# Patient Record
Sex: MALE | Race: WHITE | NOT HISPANIC OR LATINO | Employment: FULL TIME | URBAN - METROPOLITAN AREA
[De-identification: names, ages, dates, MRNs, and addresses within clinical notes are randomized per-mention and may not be internally consistent; named-entity substitution may affect disease eponyms.]

---

## 2019-01-23 ENCOUNTER — TELEPHONE (OUTPATIENT)
Dept: SCHEDULING | Facility: IMAGING CENTER | Age: 29
End: 2019-01-23

## 2019-01-31 ENCOUNTER — OFFICE VISIT (OUTPATIENT)
Dept: MEDICAL GROUP | Facility: PHYSICIAN GROUP | Age: 29
End: 2019-01-31
Payer: COMMERCIAL

## 2019-01-31 VITALS
SYSTOLIC BLOOD PRESSURE: 118 MMHG | RESPIRATION RATE: 19 BRPM | HEIGHT: 66 IN | OXYGEN SATURATION: 95 % | HEART RATE: 79 BPM | DIASTOLIC BLOOD PRESSURE: 82 MMHG | TEMPERATURE: 97.6 F | BODY MASS INDEX: 30.44 KG/M2 | WEIGHT: 189.4 LBS

## 2019-01-31 DIAGNOSIS — Z01.818 PREOPERATIVE CLEARANCE: ICD-10-CM

## 2019-01-31 DIAGNOSIS — E66.9 OBESITY (BMI 30-39.9): ICD-10-CM

## 2019-01-31 DIAGNOSIS — Z23 NEED FOR VACCINATION: ICD-10-CM

## 2019-01-31 DIAGNOSIS — K40.90 LEFT INGUINAL HERNIA: ICD-10-CM

## 2019-01-31 PROCEDURE — 90471 IMMUNIZATION ADMIN: CPT | Performed by: NURSE PRACTITIONER

## 2019-01-31 PROCEDURE — 90715 TDAP VACCINE 7 YRS/> IM: CPT | Performed by: NURSE PRACTITIONER

## 2019-01-31 PROCEDURE — 99203 OFFICE O/P NEW LOW 30 MIN: CPT | Mod: 25 | Performed by: NURSE PRACTITIONER

## 2019-01-31 ASSESSMENT — ENCOUNTER SYMPTOMS
FEVER: 0
BLURRED VISION: 0
BLOOD IN STOOL: 0
ABDOMINAL PAIN: 0
CHILLS: 0

## 2019-01-31 ASSESSMENT — PATIENT HEALTH QUESTIONNAIRE - PHQ9: CLINICAL INTERPRETATION OF PHQ2 SCORE: 0

## 2019-01-31 NOTE — PROGRESS NOTES
New Patient appointment    Mark Johnston is a 28 y.o. male here to establish care. His previous PCP was [facility]. He presents with the following concerns:    HPI:      Left inguinal hernia  Onset began 4 years ago. He does reports mild, intermittent abdominal pain. His BM are regular. He is planning to have hernia repair with Dr. Callahan at [facility] on 2/15/19.      Current medicines (including changes today)  No current outpatient prescriptions on file.     No current facility-administered medications for this visit.      He  has a past medical history of ADHD and Hernia, inguinal, left.  He  has a past surgical history that includes dental extraction(s).  Social History   Substance Use Topics   • Smoking status: Current Every Day Smoker     Packs/day: 1.00     Years: 10.00     Types: Cigarettes   • Smokeless tobacco: Never Used   • Alcohol use No     Social History     Social History Narrative   • No narrative on file     Family History   Problem Relation Age of Onset   • Cancer Mother         Lymphoma   • Lung Disease Mother    • No Known Problems Father    • No Known Problems Sister    • Breast Cancer Maternal Grandmother    • Heart Attack Paternal Grandmother    • Stroke Paternal Grandfather    • Seizures Paternal Grandfather      No family status information on file.       Review of Systems   Constitutional: Negative for chills, fever and malaise/fatigue.   HENT: Negative for hearing loss.    Eyes: Negative for blurred vision.   Cardiovascular: Negative for chest pain.   Gastrointestinal: Negative for abdominal pain and blood in stool.       All other systems reviewed and are negative    Depression Screening    Little interest or pleasure in doing things?  0 - not at all  Feeling down, depressed , or hopeless? 0 - not at all  Patient Health Questionnaire Score: 0    If depressive symptoms identified deferred to follow up visit unless specifically addressed in assesment  "and plan.      Interpretation of PHQ-9 Total Score   Score Severity   1-4 Minimal Depression   5-9 Mild Depression   10-14 Moderate Depression   15-19 Moderately Severe Depression   20-27 Severe Depression       Objective:     Blood pressure 118/82, pulse 79, temperature 36.4 °C (97.6 °F), temperature source Temporal, resp. rate 19, height 1.664 m (5' 5.5\"), weight 85.9 kg (189 lb 6.4 oz), SpO2 95 %. Body mass index is 31.04 kg/m².    Physical Exam:  Constitutional: Oriented to person, place, and time and well-developed, well-nourished, and in no distress.   HENT:   Head: Normocephalic and atraumatic.   Right Ear: Tympanic membrane and external ear normal.   Left Ear: Tympanic membrane and external ear normal.   Mouth/Throat: Oropharynx is clear and moist and mucous membranes are normal. No oropharyngeal exudate or posterior oropharyngeal erythema.   Eyes: Conjunctivae and EOM are normal. Pupils are equal, round, and reactive to light. He wears corrective glasses.  Neck: Normal range of motion. Neck supple. No thyromegaly present.   Cardiovascular: Normal rate, regular rhythm, normal heart sounds. Radial pulses intact. Exam reveals no friction rub. No murmur heard.  Pulmonary/Chest: Effort normal and breath sounds normal. No respiratory distress or use of accessory muscles. No wheezes, rhonchi, or rales.   Abdominal: Soft. Bowel sounds are normal. Exhibits no distension and no mass. There is no tenderness. No hepatosplenomegaly.    Musculoskeletal: Full range of motion. No deformity or swelling of joints. DTRs intact.   Neurological: Alert and oriented to person, place, and time. Gait normal.   Skin: Skin is warm and dry. No cyanosis. No edema.  Psychiatric: Mood, memory, affect and judgment normal.     Assessment and Plan:   The following treatment plan was discussed    1. Left inguinal hernia  He is planning to have surgery on 2/15/19 with Dr. Callahan. He had labs 2 weeks ago with previous PCP. Records requested. " Chest xray ordered.    2. Preoperative clearance  - DX-CHEST-2 VIEWS; Future    3. Obesity (BMI 30-39.9)  - Patient identified as having weight management issue.  Appropriate orders and counseling given.    4. Need for vaccination  I have placed the below orders and discussed them with an approved delegating provider.  The MA is performing the below orders under the direction of Dr. Daniel.  - Tdap =>8yo IM    Records requested.    Followup: Return in about 1 week (around 2/7/2019) for For follow-up on preop clearance.

## 2019-01-31 NOTE — LETTER
[facility]  ROSIE Estes  [address]  [facility] [address]  Fax: [phone]   Authorization for Release/Disclosure of   Protected Health Information   Name: KAILYN SIMS : 1990 SSN: [id number]   Address: [address] [place] [address] Phone:    [phone] (home)    I authorize the entity listed below to release/disclose the PHI below to:   [facility]/ROSIE Estes and ROSIE Estes   Provider or Entity Name:  [facility] Cornelia Garcia   Address   City, [facility]   Phone:  [phone]    Fax:     Reason for request: continuity of care   Information to be released:    [  ] LAST COLONOSCOPY,  including any PATH REPORT and follow-up  [  ] LAST FIT/COLOGUARD RESULT [  ] LAST DEXA  [  ] LAST MAMMOGRAM  [  ] LAST PAP  [  ] LAST LABS [  ] RETINA EXAM REPORT  [  ] IMMUNIZATION RECORDS  [  X] Release all info      [  ] Check here and initial the line next to each item to release ALL health information INCLUDING  _____ Care and treatment for drug and / or alcohol abuse  _____ HIV testing, infection status, or AIDS  _____ Genetic Testing    DATES OF SERVICE OR TIME PERIOD TO BE DISCLOSED: _____________  I understand and acknowledge that:  * This Authorization may be revoked at any time by you in writing, except if your health information has already been used or disclosed.  * Your health information that will be used or disclosed as a result of you signing this authorization could be re-disclosed by the recipient. If this occurs, your re-disclosed health information may no longer be protected by State or Federal laws.  * You may refuse to sign this Authorization. Your refusal will not affect your ability to obtain treatment.  * This Authorization becomes effective upon signing and will  on (date) __________.      If no date is indicated, this Authorization will  one (1) year from the signature date.    Name: Kailyn  Preston    Signature:   Date:     1/31/2019       PLEASE FAX REQUESTED RECORDS BACK TO: [phone]

## 2019-01-31 NOTE — ASSESSMENT & PLAN NOTE
Onset began 4 years ago. He does reports mild, intermittent abdominal pain. His BM are regular. He is planning to have hernia repair with Dr. Callahan at [facility] on 2/15/19.

## 2019-02-05 ENCOUNTER — HOSPITAL ENCOUNTER (OUTPATIENT)
Dept: RADIOLOGY | Facility: MEDICAL CENTER | Age: 29
End: 2019-02-05
Attending: NURSE PRACTITIONER
Payer: COMMERCIAL

## 2019-02-05 DIAGNOSIS — Z01.818 PREOPERATIVE CLEARANCE: ICD-10-CM

## 2019-02-05 PROCEDURE — 71046 X-RAY EXAM CHEST 2 VIEWS: CPT

## 2019-02-06 ENCOUNTER — OFFICE VISIT (OUTPATIENT)
Dept: MEDICAL GROUP | Facility: PHYSICIAN GROUP | Age: 29
End: 2019-02-06
Payer: COMMERCIAL

## 2019-02-06 ENCOUNTER — TELEPHONE (OUTPATIENT)
Dept: MEDICAL GROUP | Facility: PHYSICIAN GROUP | Age: 29
End: 2019-02-06

## 2019-02-06 VITALS
WEIGHT: 188 LBS | RESPIRATION RATE: 14 BRPM | TEMPERATURE: 98.1 F | DIASTOLIC BLOOD PRESSURE: 88 MMHG | HEART RATE: 71 BPM | BODY MASS INDEX: 30.22 KG/M2 | OXYGEN SATURATION: 95 % | HEIGHT: 66 IN | SYSTOLIC BLOOD PRESSURE: 124 MMHG

## 2019-02-06 DIAGNOSIS — K40.90 LEFT INGUINAL HERNIA: ICD-10-CM

## 2019-02-06 DIAGNOSIS — Z01.818 PREOPERATIVE CLEARANCE: ICD-10-CM

## 2019-02-06 PROCEDURE — 93000 ELECTROCARDIOGRAM COMPLETE: CPT | Performed by: NURSE PRACTITIONER

## 2019-02-06 PROCEDURE — 99214 OFFICE O/P EST MOD 30 MIN: CPT | Performed by: NURSE PRACTITIONER

## 2019-02-06 NOTE — TELEPHONE ENCOUNTER
Patient is needing an referral for Ricardo Trivedi,  for the hernia repair due to his insurance. The patient does have an appointment today for the clearance.

## 2019-02-07 NOTE — PROGRESS NOTES
"REASON FOR VISIT: Pre-Op Consultation  Consultation Requested by: Dr. Callahan  Procedure date and type: 2/15/2019    History of condition for which surgery is planned: Left hernia repair    Current chronic conditions: has Obesity (BMI 30-39.9) and Left inguinal hernia on his problem list.  Past medical history:  has a past medical history of ADHD and Hernia, inguinal, left.. Negative for: CAD, SBE, CVA, TIA, DVT, PE, bleeding requiring transfusion, intubation.  Surgical and anesthetic history:  has a past surgical history that includes dental extraction(s). Prior surgery without complication, bleeding, reaction to anesthetic, prolonged recovery  Habits:   Social History   Substance Use Topics   • Smoking status: Current Every Day Smoker     Packs/day: 1.00     Years: 10.00     Types: Cigarettes   • Smokeless tobacco: Never Used   • Alcohol use No     Allergies: Patient has no known allergies. No known allergy to Anesthetic, or Latex.     Current medicines:   No current outpatient prescriptions on file.     No current facility-administered medications for this visit.      Anticoagulant: ASA, NSAIDs    Herbals: Black Cohash, Echinacea, Garlic, Alexus, Ginkgo Biloba, Ginseng, Kava, Martin’s Wort,  Saw Palmetto, Valerian             ROS: negative for: CP, SOB, CHAVEZ, Orthopnea, wheezing, leg edema, polydipsia, polyuria, fevers, chills, sweats, cough, cold, congestion, abd pain, reflux, black or bloody stools, weight loss/gain.  Functional Status: ambulatory, he does wear glasses    PHYSICAL EXAMINATION:  VITAL SIGNS: Blood pressure 124/88, pulse 71, temperature 36.7 °C (98.1 °F), resp. rate 14, height 1.664 m (5' 5.5\"), weight 85.3 kg (188 lb), SpO2 95 %. Body mass index is 30.81 kg/m².  HEENT: EOMI, PERRL. Oropharynx pink, moist. Mallampati: II (soft palate, uvula, fauces visible). Neck supple, no cervical lymphadenopathy.   LUNGS: CTAB good excursion.   HEART: RRR no murmur.  ABDOMEN: soft, nondistended, nontender normal " BS. No HSM.  LOWER EXTREMITIES: warm and well perfused with no edema.    Labs: See attached.    IMPRESSION:  The encounter diagnosis was Preoperative clearance.  1. Planned surgery: Left inguinal repair   2. High risk medical conditions: negative for Cardiac, Pulmonary, Bleeding, Poor healing, Thrombosis, Debility    PLAN:  1. Chronic medical conditions: Stable and controlled. Continue current medicines.   2. Avoid drugs that potentiate bleeding as advised by surgeon  3. Discontinue all herbal supplements 2 weeks prior to surgery.  4. Need for SBE prophylaxis: No  5. This patient is considered Low risk for cardiopulmonary complications for this planned surgery.    Sukhdev Index for assessing perioperative cardiovascular risk [Circulation 1999;100:1047]:   (one point each for * high-risk surgery [ intrathoracic, suprainguinal vascular, intraperitoneal ],* Hx ischemic heart dz, * Hx CHF, *Hx TIA or CVA, *IDDM, *Serum Cr >2.0)   Interpretation of risk: (Complication = MI, Pulm edema, v-fib or cardiac arrest, complete heart block)  Very Low: 0 points = 0.4 % complication. Low: 1 point = 0.9 %, Moderate: 2 points = 6.6 %, High: 3+ points = 11%.

## 2019-02-11 ENCOUNTER — TELEPHONE (OUTPATIENT)
Dept: MEDICAL GROUP | Facility: PHYSICIAN GROUP | Age: 29
End: 2019-02-11

## 2019-02-11 NOTE — TELEPHONE ENCOUNTER
----- Message from ROSIE Estes sent at 2/6/2019 12:42 PM PST -----  I will discuss results at upcoming appointment.    ROSIE Estes

## 2019-02-12 ENCOUNTER — TELEPHONE (OUTPATIENT)
Dept: MEDICAL GROUP | Facility: PHYSICIAN GROUP | Age: 29
End: 2019-02-12

## 2019-02-12 NOTE — TELEPHONE ENCOUNTER
Estefani called from Dr. Trivedi office, said that she needed the PCP to place a referral for patient.  Patient is schedule for surgery this Friday. [phone].    I called referrals, the referral was completed today at 9am.    Provider had referral in on 2/6/19.    I called Estefani to let her know.

## 2019-03-08 ENCOUNTER — OFFICE VISIT (OUTPATIENT)
Dept: URGENT CARE | Facility: CLINIC | Age: 29
End: 2019-03-08
Payer: COMMERCIAL

## 2019-03-08 VITALS
SYSTOLIC BLOOD PRESSURE: 122 MMHG | OXYGEN SATURATION: 98 % | BODY MASS INDEX: 31.32 KG/M2 | DIASTOLIC BLOOD PRESSURE: 72 MMHG | TEMPERATURE: 99 F | RESPIRATION RATE: 16 BRPM | HEART RATE: 76 BPM | WEIGHT: 188 LBS | HEIGHT: 65 IN

## 2019-03-08 DIAGNOSIS — S46.811A TRAPEZIUS STRAIN, RIGHT, INITIAL ENCOUNTER: ICD-10-CM

## 2019-03-08 PROCEDURE — 99214 OFFICE O/P EST MOD 30 MIN: CPT | Performed by: PHYSICIAN ASSISTANT

## 2019-03-08 RX ORDER — CYCLOBENZAPRINE HCL 10 MG
10 TABLET ORAL 3 TIMES DAILY PRN
Qty: 30 TAB | Refills: 0 | Status: SHIPPED | OUTPATIENT
Start: 2019-03-08 | End: 2022-01-21

## 2019-03-08 ASSESSMENT — ENCOUNTER SYMPTOMS
FALLS: 0
SENSORY CHANGE: 0
FOCAL WEAKNESS: 0
TINGLING: 0

## 2019-03-09 NOTE — PROGRESS NOTES
"Subjective:   Mark Johnston is a 29 y.o. male who presents for Shoulder Pain (R shoulder pain x3 days, states no injury)    This is a new problem.  Patient presents to urgent care with right posterior shoulder/upper back pain for the past 3 days.  Patient denies any injury or recent fall.  He denies any neck pain.  He denies any numbness, tingling, weakness of the extremities.  He denies any prior issues with the shoulder.  The patient states that he had some leftover oxycodone from a recent surgery that he took for the shoulder pain which did help and helped him to get some rest.  He is not been taking any anti-inflammatory medication.        Shoulder Pain       Review of Systems   Musculoskeletal: Positive for joint pain. Negative for falls.   Neurological: Negative for tingling, sensory change and focal weakness.   All other systems reviewed and are negative.    No Known Allergies     Objective:   /72 (BP Location: Left arm, Patient Position: Sitting)   Pulse 76   Temp 37.2 °C (99 °F)   Resp 16   Ht 1.651 m (5' 5\")   Wt 85.3 kg (188 lb)   SpO2 98%   BMI 31.28 kg/m²   Physical Exam   Constitutional: He is oriented to person, place, and time. He appears well-developed and well-nourished.   HENT:   Head: Normocephalic and atraumatic.   Right Ear: Tympanic membrane, external ear and ear canal normal.   Left Ear: Tympanic membrane, external ear and ear canal normal.   Nose: Nose normal.   Mouth/Throat: Uvula is midline, oropharynx is clear and moist and mucous membranes are normal. No oropharyngeal exudate.   Eyes: Pupils are equal, round, and reactive to light. Conjunctivae and EOM are normal.   Neck: Normal range of motion. Neck supple.   Cardiovascular: Normal rate, regular rhythm and normal heart sounds.  Exam reveals no friction rub.    No murmur heard.  Pulses:       Radial pulses are 2+ on the right side.   Pulmonary/Chest: Effort normal and breath sounds normal. No respiratory distress.   "   Abdominal: Soft. Bowel sounds are normal. There is no hepatosplenomegaly. There is no tenderness.   Musculoskeletal: Normal range of motion.        Right shoulder: He exhibits tenderness, crepitus, pain and spasm. He exhibits normal range of motion, no bony tenderness, no swelling, no effusion and no deformity.        Cervical back: He exhibits tenderness, pain and spasm. He exhibits no bony tenderness, no swelling, no edema and no deformity.        Thoracic back: Normal.        Back:         Arms:  Right shoulder: Full range of motion without limitation.  Negative drop arm  Negative empty can test  No pain with resisted abduction.   Lymphadenopathy:        Head (right side): No submental, no submandibular and no tonsillar adenopathy present.        Head (left side): No submental, no submandibular and no tonsillar adenopathy present.     He has no cervical adenopathy.        Right: No supraclavicular adenopathy present.        Left: No supraclavicular adenopathy present.   Neurological: He is alert and oriented to person, place, and time. He has normal strength. No cranial nerve deficit or sensory deficit. Coordination normal.   Skin: Skin is warm and dry. No rash noted.   Psychiatric: He has a normal mood and affect. Judgment normal.   Vitals reviewed.          Assessment/Plan:   Assessment    1. Trapezius strain, right, initial encounter  - cyclobenzaprine (FLEXERIL) 10 MG Tab; Take 1 Tab by mouth 3 times a day as needed.  Dispense: 30 Tab; Refill: 0    Patient does continually bring up the issue of pain medication.  I did review with the patient that I will do not feel that narcotic pain medication is warranted for this particular complaint.  Prescription for Flexeril is provided.  Recommend over-the-counter ibuprofen 600-800 mg every 8 hours as needed.  He may use over-the-counter muscle rub such as Biofreeze.  I did offer to place a referral to sports medicine which she declines at this time.    Patient  states he has an appointment with his primary care on Monday which she is encouraged to keep.    Differential diagnosis, natural history, supportive care, and indications for immediate follow-up discussed.    If not improving in 3-5 days, F/U with PCP or return to  or sooner if worsens  Strict ER precautions given.    Please note that this note was created using voice recognition speech to text software. Every effort has been made to correct obvious errors.  However, I expect there are errors of grammar and possibly context that were not discovered prior to finalizing the note

## 2019-03-09 NOTE — PATIENT INSTRUCTIONS
Shoulder Pain  Many things can cause shoulder pain, including:  · An injury.  · Moving the arm in the same way again and again (overuse).  · Joint pain (arthritis).  Follow these instructions at home:  Take these actions to help with your pain:  · Squeeze a soft ball or a foam pad as much as you can. This helps to prevent swelling. It also makes the arm stronger.  · Take over-the-counter and prescription medicines only as told by your doctor.  · If told, put ice on the area:  ¨ Put ice in a plastic bag.  ¨ Place a towel between your skin and the bag.  ¨ Leave the ice on for 20 minutes, 2-3 times per day. Stop putting on ice if it does not help with the pain.  · If you were given a shoulder sling or immobilizer:  ¨ Wear it as told.  ¨ Remove it to shower or bathe.  ¨ Move your arm as little as possible.  ¨ Keep your hand moving. This helps prevent swelling.  Contact a doctor if:  · Your pain gets worse.  · Medicine does not help your pain.  · You have new pain in your arm, hand, or fingers.  Get help right away if:  · Your arm, hand, or fingers:  ¨ Tingle.  ¨ Are numb.  ¨ Are swollen.  ¨ Are painful.  ¨ Turn white or blue.  This information is not intended to replace advice given to you by your health care provider. Make sure you discuss any questions you have with your health care provider.  Document Released: 06/05/2009 Document Revised: 08/13/2017 Document Reviewed: 04/11/2016  [employer] Interactive Patient Education © 2017 [employer]    Muscle Strain  A muscle strain (pulled muscle) happens when a muscle is stretched beyond normal length. It happens when a sudden, violent force stretches your muscle too far. Usually, a few of the fibers in your muscle are torn. Muscle strain is common in athletes. Recovery usually takes 1-2 weeks. Complete healing takes 5-6 weeks.  Follow these instructions at home:  · Follow the PRICE method of treatment to help your injury get better. Do this the first 2-3 days after the  injury:  ¨ Protect. Protect the muscle to keep it from getting injured again.  ¨ Rest. Limit your activity and rest the injured body part.  ¨ Ice. Put ice in a plastic bag. Place a towel between your skin and the bag. Then, apply the ice and leave it on from 15-20 minutes each hour. After the third day, switch to moist heat packs.  ¨ Compression. Use a splint or elastic bandage on the injured area for comfort. Do not put it on too tightly.  ¨ Elevate. Keep the injured body part above the level of your heart.  · Only take medicine as told by your doctor.  · Warm up before doing exercise to prevent future muscle strains.  Contact a doctor if:  · You have more pain or puffiness (swelling) in the injured area.  · You feel numbness, tingling, or notice a loss of strength in the injured area.  This information is not intended to replace advice given to you by your health care provider. Make sure you discuss any questions you have with your health care provider.  Document Released: 09/26/2009 Document Revised: 05/25/2017 Document Reviewed: 07/17/2014  [employer] Interactive Patient Education © 2017 [employer]

## 2019-03-11 ENCOUNTER — OFFICE VISIT (OUTPATIENT)
Dept: MEDICAL GROUP | Facility: PHYSICIAN GROUP | Age: 29
End: 2019-03-11
Payer: COMMERCIAL

## 2019-03-11 VITALS
WEIGHT: 193 LBS | RESPIRATION RATE: 14 BRPM | TEMPERATURE: 98.3 F | HEART RATE: 62 BPM | SYSTOLIC BLOOD PRESSURE: 132 MMHG | DIASTOLIC BLOOD PRESSURE: 92 MMHG | HEIGHT: 65 IN | BODY MASS INDEX: 32.15 KG/M2 | OXYGEN SATURATION: 95 %

## 2019-03-11 DIAGNOSIS — S46.011A ROTATOR CUFF STRAIN, RIGHT, INITIAL ENCOUNTER: ICD-10-CM

## 2019-03-11 PROCEDURE — 99214 OFFICE O/P EST MOD 30 MIN: CPT | Performed by: FAMILY MEDICINE

## 2019-03-11 RX ORDER — TIZANIDINE 4 MG/1
4 TABLET ORAL NIGHTLY PRN
Qty: 30 TAB | Refills: 2 | Status: SHIPPED | OUTPATIENT
Start: 2019-03-11 | End: 2022-01-21

## 2019-03-11 RX ORDER — ETODOLAC 400 MG/1
400 TABLET, FILM COATED ORAL 2 TIMES DAILY
Qty: 60 TAB | Refills: 2 | Status: SHIPPED | OUTPATIENT
Start: 2019-03-11 | End: 2022-01-21

## 2019-03-11 ASSESSMENT — ENCOUNTER SYMPTOMS
HEADACHES: 0
BRUISES/BLEEDS EASILY: 0
HEMOPTYSIS: 0
CONSTITUTIONAL NEGATIVE: 1
EYES NEGATIVE: 1
RESPIRATORY NEGATIVE: 1
NAUSEA: 0
BLURRED VISION: 0
ARTHRALGIAS: 1
DEPRESSION: 0
COUGH: 0
ANOREXIA: 0
FEVER: 0
PALPITATIONS: 0
TINGLING: 0
GASTROINTESTINAL NEGATIVE: 1
DIZZINESS: 0
HEARTBURN: 0
CARDIOVASCULAR NEGATIVE: 1
MYALGIAS: 0
PSYCHIATRIC NEGATIVE: 1
CHILLS: 0
ABDOMINAL PAIN: 0
DOUBLE VISION: 0
NEUROLOGICAL NEGATIVE: 1

## 2019-03-11 NOTE — PROGRESS NOTES
Subjective:      Mark Johnston is a 29 y.o. male who presents with Shoulder Pain (right shoulder x6 days)            1. Rotator cuff strain, right, initial encounter  Just went back to work after hernia surgery and may have lifting some things wrong  - tizanidine (ZANAFLEX) 4 MG Tab; Take 1 Tab by mouth at bedtime as needed.  Dispense: 30 Tab; Refill: 2  - etodolac (LODINE) 400 MG tablet; Take 1 Tab by mouth 2 times a day.  Dispense: 60 Tab; Refill: 2    Past Medical History:  No date: ADHD  No date: Hernia, inguinal, left  Past Surgical History:  No date: DENTAL EXTRACTION(S)  Smoking status: Current Every Day Smoker                                                   Packs/day: 1.00      Years: 10.00        Types: Cigarettes  Smokeless tobacco: Never Used                      Alcohol use: No              Review of patient's family history indicates:  Problem: Cancer      Relation: Mother       Age of Onset: (Not Specified)       Comment: Lymphoma  Problem: Lung Disease      Relation: Mother       Age of Onset: (Not Specified)   Problem: No Known Problems      Relation: Father       Age of Onset: (Not Specified)   Problem: No Known Problems      Relation: Sister       Age of Onset: (Not Specified)   Problem: Breast Cancer      Relation: Maternal Grandmother       Age of Onset: (Not Specified)   Problem: Heart Attack      Relation: Paternal Grandmother       Age of Onset: (Not Specified)   Problem: Stroke      Relation: Paternal Grandfather       Age of Onset: (Not Specified)   Problem: Seizures      Relation: Paternal Grandfather       Age of Onset: (Not Specified)       Current Outpatient Prescriptions: •  tizanidine (ZANAFLEX) 4 MG Tab, Take 1 Tab by mouth at bedtime as needed., Disp: 30 Tab, Rfl: 2•  etodolac (LODINE) 400 MG tablet, Take 1 Tab by mouth 2 times a day., Disp: 60 Tab, Rfl: 2•  cyclobenzaprine (FLEXERIL) 10 MG Tab, Take 1 Tab by mouth 3 times a day as needed., Disp: 30 Tab, Rfl: 0    Patient was  "instructed on the use of medications, either prescriptions or OTC and informed on when the appropriate follow up time period should be. In addition, patient was also instructed that should any acute worsening occur that they should notify this clinic asap or call 911.          Shoulder Pain   This is a new problem. The current episode started in the past 7 days. The problem occurs constantly. The problem has been gradually improving. Associated symptoms include arthralgias. Pertinent negatives include no abdominal pain, anorexia, chest pain, chills, coughing, fever, headaches, myalgias, nausea or rash. The symptoms are aggravated by exertion and twisting. He has tried rest for the symptoms. The treatment provided mild relief.       Review of Systems   Constitutional: Negative.  Negative for chills and fever.   HENT: Negative.  Negative for hearing loss.    Eyes: Negative.  Negative for blurred vision and double vision.   Respiratory: Negative.  Negative for cough and hemoptysis.    Cardiovascular: Negative.  Negative for chest pain and palpitations.   Gastrointestinal: Negative.  Negative for abdominal pain, anorexia, heartburn and nausea.   Genitourinary: Negative.  Negative for dysuria.   Musculoskeletal: Positive for arthralgias and joint pain. Negative for myalgias.   Skin: Negative.  Negative for rash.   Neurological: Negative.  Negative for dizziness, tingling and headaches.   Endo/Heme/Allergies: Negative.  Does not bruise/bleed easily.   Psychiatric/Behavioral: Negative.  Negative for depression and suicidal ideas.   All other systems reviewed and are negative.         Objective:     /92 (BP Location: Left arm, Patient Position: Sitting)   Pulse 62   Temp 36.8 °C (98.3 °F)   Resp 14   Ht 1.651 m (5' 5\")   Wt 87.5 kg (193 lb)   SpO2 95%   BMI 32.12 kg/m²      Physical Exam   Constitutional: He is oriented to person, place, and time. He appears well-developed and well-nourished. No distress. "   HENT:   Head: Normocephalic and atraumatic.   Mouth/Throat: Oropharynx is clear and moist. No oropharyngeal exudate.   Eyes: Pupils are equal, round, and reactive to light.   Cardiovascular: Normal rate, regular rhythm, normal heart sounds and intact distal pulses.  Exam reveals no gallop and no friction rub.    No murmur heard.  Pulmonary/Chest: Effort normal and breath sounds normal. No respiratory distress. He has no wheezes. He has no rales. He exhibits no tenderness.   Musculoskeletal:        Right shoulder: He exhibits decreased range of motion and pain. He exhibits no tenderness.   Decreased internal rotation   Neurological: He is alert and oriented to person, place, and time.   Skin: He is not diaphoretic.   Psychiatric: He has a normal mood and affect. His behavior is normal. Judgment and thought content normal.   Nursing note and vitals reviewed.              Assessment/Plan:     1. Rotator cuff strain, right, initial encounter    - tizanidine (ZANAFLEX) 4 MG Tab; Take 1 Tab by mouth at bedtime as needed.  Dispense: 30 Tab; Refill: 2  - etodolac (LODINE) 400 MG tablet; Take 1 Tab by mouth 2 times a day.  Dispense: 60 Tab; Refill: 2

## 2020-05-18 ENCOUNTER — OFFICE VISIT (OUTPATIENT)
Dept: URGENT CARE | Facility: CLINIC | Age: 30
End: 2020-05-18
Payer: COMMERCIAL

## 2020-05-18 VITALS
DIASTOLIC BLOOD PRESSURE: 70 MMHG | OXYGEN SATURATION: 95 % | HEIGHT: 65 IN | BODY MASS INDEX: 30.16 KG/M2 | HEART RATE: 61 BPM | TEMPERATURE: 97.7 F | SYSTOLIC BLOOD PRESSURE: 106 MMHG | WEIGHT: 181 LBS | RESPIRATION RATE: 14 BRPM

## 2020-05-18 DIAGNOSIS — S61.421A LACERATION OF RIGHT HAND WITH FOREIGN BODY, INITIAL ENCOUNTER: ICD-10-CM

## 2020-05-18 PROCEDURE — 12001 RPR S/N/AX/GEN/TRNK 2.5CM/<: CPT | Performed by: FAMILY MEDICINE

## 2020-05-18 RX ORDER — SULFAMETHOXAZOLE AND TRIMETHOPRIM 800; 160 MG/1; MG/1
1 TABLET ORAL 2 TIMES DAILY
Qty: 10 TAB | Refills: 0 | Status: SHIPPED | OUTPATIENT
Start: 2020-05-18 | End: 2020-05-23

## 2020-05-18 ASSESSMENT — ENCOUNTER SYMPTOMS
ROS SKIN COMMENTS: RIGHT HAND LACERATION
FEVER: 0
VOMITING: 0
SHORTNESS OF BREATH: 0
SENSORY CHANGE: 0
TINGLING: 0

## 2020-05-18 NOTE — PROGRESS NOTES
"Subjective:     Mark Johnston is a 30 y.o. male who presents for Laceration (right hand)    HPI  Pt presents for evaluation of a new problem   Pt with a laceration to right palm less than an hour ago   Laceration sustained from glass   Able to largely stop bleeding on his own   No associated numbness/tingling   Retains full ROM and use of all fingers   Last tetanus immunization was about 1 year ago     Review of Systems   Constitutional: Negative for fever.   Respiratory: Negative for shortness of breath.    Gastrointestinal: Negative for vomiting.   Skin:        Right hand laceration    Neurological: Negative for tingling and sensory change.     PMH:  has a past medical history of ADHD and Hernia, inguinal, left.  MEDS:   Current Outpatient Medications:   •  tizanidine (ZANAFLEX) 4 MG Tab, Take 1 Tab by mouth at bedtime as needed., Disp: 30 Tab, Rfl: 2  •  etodolac (LODINE) 400 MG tablet, Take 1 Tab by mouth 2 times a day., Disp: 60 Tab, Rfl: 2  •  cyclobenzaprine (FLEXERIL) 10 MG Tab, Take 1 Tab by mouth 3 times a day as needed., Disp: 30 Tab, Rfl: 0  ALLERGIES: No Known Allergies  SURGHX:   Past Surgical History:   Procedure Laterality Date   • DENTAL EXTRACTION(S)       SOCHX:  reports that he has been smoking cigarettes. He has a 10.00 pack-year smoking history. He has never used smokeless tobacco. He reports current drug use. Drug: Marijuana. He reports that he does not drink alcohol.  FH: Family history was reviewed, not contributing to acute illness     Objective:   /70 (BP Location: Right arm, Patient Position: Sitting)   Pulse 61   Temp 36.5 °C (97.7 °F)   Resp 14   Ht 1.651 m (5' 5\")   Wt 82.1 kg (181 lb)   SpO2 95%   BMI 30.12 kg/m²     Physical Exam  Constitutional:       General: He is not in acute distress.     Appearance: He is well-developed. He is not diaphoretic.   HENT:      Head: Normocephalic and atraumatic.   Musculoskeletal:      Comments: Right palm with 2cm full thickness " laceration, no damage to tendon or deeper structures   Retains FROM and full use of all fingers   No sensation deficit    Skin:     General: Skin is warm and dry.      Findings: No rash.   Neurological:      Mental Status: He is alert and oriented to person, place, and time.   Psychiatric:         Behavior: Behavior normal.         Thought Content: Thought content normal.         Judgment: Judgment normal.       Laceration repair  Area irrigated with Hibiclens and 2 tiny pieces of glass removed from wound.  Area then again cleaned with Betadine and anesthetized with 2% lidocaine without epinephrine.  Using 4 interrupted sutures of 4-0 Vicryl, area well approximated and hemostatic.  Patient tolerated procedure well without any acute complications.    Assessment/Plan:   Assessment    1. Laceration of right hand with foreign body, initial encounter  As above, laceration repaired without any acute complications.  Reviewed wound care and follow precautions.  Will place on Bactrim preventatively and follow-up in approximately 10 days for suture removal.

## 2020-05-27 ENCOUNTER — OFFICE VISIT (OUTPATIENT)
Dept: URGENT CARE | Facility: CLINIC | Age: 30
End: 2020-05-27
Payer: COMMERCIAL

## 2020-05-27 VITALS
WEIGHT: 181 LBS | DIASTOLIC BLOOD PRESSURE: 82 MMHG | SYSTOLIC BLOOD PRESSURE: 112 MMHG | OXYGEN SATURATION: 96 % | HEART RATE: 76 BPM | BODY MASS INDEX: 30.16 KG/M2 | RESPIRATION RATE: 14 BRPM | TEMPERATURE: 98.6 F | HEIGHT: 65 IN

## 2020-05-27 DIAGNOSIS — Z48.02 VISIT FOR SUTURE REMOVAL: ICD-10-CM

## 2020-05-27 PROCEDURE — 99212 OFFICE O/P EST SF 10 MIN: CPT | Performed by: NURSE PRACTITIONER

## 2020-05-27 ASSESSMENT — ENCOUNTER SYMPTOMS
TINGLING: 0
FEVER: 0
CHILLS: 0

## 2020-05-27 NOTE — PROCEDURES
Suture Removal    Date/Time: 5/27/2020 3:13 PM  Performed by: SHANE SmithPJANELLE  Authorized by: SHANE SmithPERROL.   Body area: upper extremity (Right palm)  Wound Appearance: clean  Sutures Removed: 4  Post-removal: dressing applied  Facility: sutures placed in this facility  Patient tolerance: Patient tolerated the procedure well with no immediate complications

## 2020-05-27 NOTE — PROGRESS NOTES
"Subjective:      Mark Johnston is a 30 y.o. male who presents with Suture / Staple Removal    Reviewed past medical, surgical and family history. Reviewed prescription and OTC medications with patient in electronic health record today  Allergies: Patient has no known allergies.                HPI This is a new problem.  Here today for suture removal. Sutures placed in right palm of hand on 05/18/2020. He took antibiotics as prescribed. Feels that his hand has healed well. No drainage , swelling or redness. Denies pain.     Review of Systems   Constitutional: Negative for chills and fever.   Musculoskeletal: Negative for joint pain.   Neurological: Negative for tingling.          Objective:     /82 (BP Location: Right arm, Patient Position: Sitting)   Pulse 76   Temp 37 °C (98.6 °F)   Resp 14   Ht 1.651 m (5' 5\")   Wt 82.1 kg (181 lb)   SpO2 96%   BMI 30.12 kg/m²      Physical Exam  Constitutional:       Appearance: Normal appearance. He is normal weight.   Cardiovascular:      Rate and Rhythm: Normal rate.   Musculoskeletal:      Right hand: Normal. He exhibits normal range of motion, no tenderness, no bony tenderness, normal two-point discrimination, normal capillary refill and no deformity. Normal sensation noted. Normal strength noted. He exhibits no thumb/finger opposition.   Skin:     General: Skin is warm.   Neurological:      Mental Status: He is alert and oriented to person, place, and time.   Psychiatric:         Mood and Affect: Mood normal.         Behavior: Behavior normal.         Thought Content: Thought content normal.                 Assessment/Plan:       1. Visit for suture removal  Suture Removal       FU prn     "

## 2022-01-21 ENCOUNTER — OCCUPATIONAL MEDICINE (OUTPATIENT)
Dept: URGENT CARE | Facility: CLINIC | Age: 32
End: 2022-01-21
Payer: COMMERCIAL

## 2022-01-21 VITALS
BODY MASS INDEX: 27.81 KG/M2 | RESPIRATION RATE: 14 BRPM | TEMPERATURE: 99 F | HEART RATE: 45 BPM | DIASTOLIC BLOOD PRESSURE: 60 MMHG | HEIGHT: 65 IN | SYSTOLIC BLOOD PRESSURE: 100 MMHG | OXYGEN SATURATION: 98 % | WEIGHT: 166.9 LBS

## 2022-01-21 DIAGNOSIS — S20.211A CONTUSION OF RIB ON RIGHT SIDE, INITIAL ENCOUNTER: ICD-10-CM

## 2022-01-21 PROCEDURE — 99213 OFFICE O/P EST LOW 20 MIN: CPT | Performed by: FAMILY MEDICINE
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
## 2022-01-28 ENCOUNTER — OCCUPATIONAL MEDICINE (OUTPATIENT)
Dept: URGENT CARE | Facility: CLINIC | Age: 32
End: 2022-01-28
Payer: COMMERCIAL

## 2022-01-28 ENCOUNTER — APPOINTMENT (OUTPATIENT)
Dept: RADIOLOGY | Facility: IMAGING CENTER | Age: 32
End: 2022-01-28
Attending: NURSE PRACTITIONER
Payer: COMMERCIAL

## 2022-01-28 VITALS
OXYGEN SATURATION: 99 % | TEMPERATURE: 98.5 F | WEIGHT: 167.5 LBS | HEIGHT: 65 IN | SYSTOLIC BLOOD PRESSURE: 100 MMHG | RESPIRATION RATE: 14 BRPM | BODY MASS INDEX: 27.91 KG/M2 | DIASTOLIC BLOOD PRESSURE: 54 MMHG | HEART RATE: 50 BPM

## 2022-01-28 DIAGNOSIS — S20.211D CONTUSION OF RIB ON RIGHT SIDE, SUBSEQUENT ENCOUNTER: ICD-10-CM

## 2022-01-28 PROCEDURE — 99214 OFFICE O/P EST MOD 30 MIN: CPT | Performed by: NURSE PRACTITIONER

## 2022-01-28 PROCEDURE — 71101 X-RAY EXAM UNILAT RIBS/CHEST: CPT | Mod: TC,RT | Performed by: NURSE PRACTITIONER

## 2022-01-28 ASSESSMENT — ENCOUNTER SYMPTOMS
ROS SKIN COMMENTS: NO ABRASION OR LACERATION
BACK PAIN: 0
FOCAL WEAKNESS: 0
MYALGIAS: 0
SENSORY CHANGE: 0

## 2022-01-28 NOTE — LETTER
[facility]  [address] [place] [address]  Phone:  [phone] - Fax:  [phone]   [facility] Progress Report and Disability Certification  Date of Service: 1/28/2022   No Show:  No  Date / Time of Next Visit: 2/4/2022   Claim Information   Patient Name: Mark Johnston  Claim Number:     Employer:  [employer] Date of Injury: 1/21/2022     Insurer / TPA: Arlene Nazario  ID / SSN:     Occupation:   Diagnosis: The encounter diagnosis was Contusion of rib on right side, subsequent encounter.    Medical Information   Related to Industrial Injury? Yes    Subjective Complaints:  DOI: 1/21/2022. Reports some improvement, stating only a small improvement. Has continued tenderness to right side along lower ribs. Pain increased with laying down and movement. Pain currently 4/10.  No abdominal swelling. Denies shortness of breath, hemoptysis, elevated heart rate, abdominal pain or swelling, neck or back pain. Taking OTC ibuprofen as needed.       Objective Findings: Physical Exam  Vitals reviewed.   Pulmonary:      Effort: Pulmonary effort is normal. No accessory muscle usage, prolonged expiration, respiratory distress or retractions.      Breath sounds: No stridor. No decreased breath sounds.   Chest:      Chest wall: Tenderness present. No deformity, swelling, crepitus or edema.      Comments: Lateral lower right rib tenderness to palpation.   Abdominal:      General: Bowel sounds are normal. There is no distension.      Palpations: Abdomen is soft.      Tenderness: There is no abdominal tenderness. There is no guarding.   Musculoskeletal:         General: Tenderness present.      Cervical back: Normal range of motion. No bony tenderness.      Thoracic back: No bony tenderness.      Lumbar back: No bony tenderness.   Skin:     General: Skin is warm and dry.      Findings: No abrasion or bruising.   Neurological:       Mental Status: He is alert and oriented to person, place, and time.        Pre-Existing Condition(s): None known.   Assessment:   Condition Improved    Status: Additional Care Required  Permanent Disability:No    Plan: MedicationDiagnostics    Diagnostics: X-ray  Comments:- [id number] (WITH 1-VIEW CXR) RIGHT    Comments:  Normal rib series.    Disability Information   Status: Released to Restricted Duty    From:  1/28/2022  Through: 2/4/2022 Restrictions are: Temporary   Physical Restrictions   Sitting:    Standing:    Stooping:    Bending:  < or = to 2 hrs/day   Squatting:    Walking:    Climbing:    Pushing:  < or = to 2 hrs/day   Pulling:  < or = to 2 hrs/day Other:    Reaching Above Shoulder (L):   Reaching Above Shoulder (R):       Reaching Below Shoulder (L):    Reaching Below Shoulder (R):      Not to exceed Weight Limits   Carrying(hrs):   Weight Limit(lb): < or = to 10 pounds Lifting(hrs):   Weight  Limit(lb): < or = to 10 pounds   Comments: -Rest and Ice   -Try not to avoid taking full respirations, breath normal. Splinting for pain.  -NSAIDs (Ibuprofen) for pain and inflammation.   -OTC lidocaine patches  -Can also take OTC Tylenol as directed for pain.   -Follow up in 1 week     Follow up emergently for difficulty breathing, severe uncontrolled pain, coughing up blood, elevated heart rate, abdominal pain or swelling, fever, weakness or dizziness.    Repetitive Actions   Hands: i.e. Fine Manipulations from Grasping:     Feet: i.e. Operating Foot Controls:     Driving / Operate Machinery:     Health Care Provider’s Original or Electronic Signature  ROSIE Parsons Health Care Provider’s Original or Electronic Signature    Zachariah Clancy MD         Clinic Name / Location: [facility] [address] [address] Clinic Phone Number: Dept: [phone]   Appointment Time: 5:00 Pm Visit Start Time: 6:13 PM   Check-In Time:  5:07 Pm Visit Discharge  Time:  7:26PM   Original-Treating Physician or Chiropractor    Page 2-Insurer/TPA    Page 3-Employer    Page 4-Employee
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
## 2022-01-29 NOTE — PROGRESS NOTES
Subjective:     Mark Johnston is a 31 y.o. male who presents for Work-Related Injury ((R) rib pain still the same from last visit )      DOI: 1/21/2022. Reports some improvement, stating only a small improvement. Has continued tenderness to right side along lower ribs. Pain increased with laying down and movement. Pain currently 4/10.  No abdominal swelling. Denies shortness of breath, hemoptysis, elevated heart rate, abdominal pain or swelling, neck or back pain. Taking OTC ibuprofen as needed.        Past Medical History:   Diagnosis Date   • ADHD    • Hernia, inguinal, left        Past Surgical History:   Procedure Laterality Date   • DENTAL EXTRACTION(S)         Social History     Socioeconomic History   • Marital status: Single     Spouse name: Not on file   • Number of children: Not on file   • Years of education: Not on file   • Highest education level: Not on file   Occupational History     Comment:    Tobacco Use   • Smoking status: Light Tobacco Smoker     Packs/day: 1.00     Years: 10.00     Pack years: 10.00     Types: Cigarettes   • Smokeless tobacco: Never Used   Vaping Use   • Vaping Use: Never used   Substance and Sexual Activity   • Alcohol use: No   • Drug use: Yes     Types: Marijuana   • Sexual activity: Yes     Partners: Female     Birth control/protection: Condom   Other Topics Concern   • Not on file   Social History Narrative   • Not on file     Social Determinants of Health     Financial Resource Strain:    • Difficulty of Paying Living Expenses: Not on file   Food Insecurity:    • Worried About Running Out of Food in the Last Year: Not on file   • Ran Out of Food in the Last Year: Not on file   Transportation Needs:    • Lack of Transportation (Medical): Not on file   • Lack of Transportation (Non-Medical): Not on file   Physical Activity:    • Days of Exercise per Week: Not on file   • Minutes of Exercise per Session: Not on file   Stress:    • Feeling of Stress : Not on file  "  Social Connections:    • Frequency of Communication with Friends and Family: Not on file   • Frequency of Social Gatherings with Friends and Family: Not on file   • Attends Religion Services: Not on file   • Active Member of Clubs or Organizations: Not on file   • Attends Club or Organization Meetings: Not on file   • Marital Status: Not on file   Intimate Partner Violence:    • Fear of Current or Ex-Partner: Not on file   • Emotionally Abused: Not on file   • Physically Abused: Not on file   • Sexually Abused: Not on file   Housing Stability:    • Unable to Pay for Housing in the Last Year: Not on file   • Number of Places Lived in the Last Year: Not on file   • Unstable Housing in the Last Year: Not on file        Family History   Problem Relation Age of Onset   • Cancer Mother         Lymphoma   • Lung Disease Mother    • No Known Problems Father    • No Known Problems Sister    • Breast Cancer Maternal Grandmother    • Heart Attack Paternal Grandmother    • Stroke Paternal Grandfather    • Seizures Paternal Grandfather         No Known Allergies    Review of Systems   All other systems reviewed and are negative.       Objective:   /54 (BP Location: Right arm, Patient Position: Sitting)   Pulse (!) 50   Temp 36.9 °C (98.5 °F) (Temporal)   Resp 14   Ht 1.651 m (5' 5\")   Wt 76 kg (167 lb 8 oz)   SpO2 99%   BMI 27.87 kg/m²     Physical Exam  Vitals reviewed.   Pulmonary:      Effort: Pulmonary effort is normal. No accessory muscle usage, prolonged expiration, respiratory distress or retractions.      Breath sounds: No stridor. No decreased breath sounds.   Chest:      Chest wall: Tenderness present. No deformity, swelling, crepitus or edema.      Comments: Lateral lower right rib tenderness to palpation.   Abdominal:      General: Bowel sounds are normal. There is no distension.      Palpations: Abdomen is soft.      Tenderness: There is no abdominal tenderness. There is no guarding. "   Musculoskeletal:         General: Tenderness present.      Cervical back: Normal range of motion. No bony tenderness.      Thoracic back: No bony tenderness.      Lumbar back: No bony tenderness.   Skin:     General: Skin is warm and dry.      Findings: No abrasion or bruising.   Neurological:      Mental Status: He is alert and oriented to person, place, and time.         Assessment/Plan:   1. Contusion of rib on right side, subsequent encounter  - [id number] (WITH 1-VIEW CXR) RIGHT; Future  -Rest and Ice   -Try not to avoid taking full respirations, breath normal. Splinting for pain.  -NSAIDs (Ibuprofen) for pain and inflammation.   -OTC lidocaine patches  -Can also take OTC Tylenol as directed for pain.   -Follow up in 1 week     Follow up emergently for difficulty breathing, severe uncontrolled pain, coughing up blood, elevated heart rate, abdominal pain or swelling, fever, weakness or dizziness.     [id number] (WITH 1-VIEW CXR) RIGHT    Result Date: 1/28/2022 1/28/2022 6:51 PM HISTORY/REASON FOR EXAM:  Pain Following Trauma. TECHNIQUE/EXAM DESCRIPTION AND NUMBER OF VIEWS:  5 images of the right ribs and chest. COMPARISON: Chest x-ray February 5, 2019 FINDINGS: No fractures or acute bony changes are noted.  There is no evidence of a hemo or pneumothorax.     Normal rib series.    Reviewed notes from previous visit. Discussed imaging with level of reported rib pain. Stable vitals. Clear breath sounds.     Differential diagnosis, natural history, supportive care, and indications for immediate follow-up discussed.

## 2022-01-29 NOTE — PATIENT INSTRUCTIONS
Chest Contusion, Adult  A chest contusion is a deep bruise to the chest. Contusions are usually the result of a blunt injury to tissues under the skin. The injury can damage the small blood vessels under the skin, which causes bleeding under the skin. The skin overlying the contusion may turn blue, purple, or yellow. Minor injuries may give you a painless contusion, but more severe contusions may stay painful and swollen for a few weeks.  What are the causes?  A contusion is usually caused by a hard hit (blow), trauma, or direct force to your chest, such as:  · A motor vehicle accident.  · Falls.  · Bicycle injuries.  · Contact sport injuries.  What increases the risk?  You may be at a higher risk for a chest contusion if you play a sport in which falls and contact are common, such as football or soccer.  What are the signs or symptoms?  Symptoms of this condition include:  · Chest swelling.  · Pain and tenderness of the chest.  · Discomfort with certain movements of the upper torso.  · Discoloration of the chest. The area may have redness and then turn blue, purple, or yellow.  · Discomfort when taking deep breaths.  How is this diagnosed?  A chest contusion is diagnosed from a physical exam and your medical history. An X-ray may be needed to determine if there were any associated injuries, such as broken bones (fractures). Sometimes other tests such as CT scans, ultrasounds, or MRIs may be needed if internal injuries are suspected.  A test that shows the amount of oxygen in your blood (pulse oximetry) may be done if you have trouble breathing.  How is this treated?  Often, the best treatment for a chest contusion is resting and applying ice to the injured area. Deep-breathing exercises may be recommended to reduce the risk of pneumonia. Oxygen therapy may be given if you have trouble breathing or have low oxygen levels. Over-the-counter medicines may also be recommended for pain control.  Follow these instructions  at home:  · If directed, apply ice to the injured area.  ¨ Put ice in a plastic bag.  ¨ Place a towel between your skin and the bag.  ¨ Leave the ice on for 20 minutes, 2-3 times per day.  · Take over-the-counter and prescription medicines only as told by your health care provider.  · Do any deep-breathing exercises as told by your health care provider, if this applies.  · Do not lie down flat on your back. Keep your head and chest raised (elevated) when you are resting or sleeping.  · Do not use any products that contain nicotine or tobacco, such as cigarettes and e-cigarettes. If you need help quitting, ask your health care provider.  · Do not lift anything that causes you discomfort or pain.  Contact a health care provider if:  · Your swelling or pain is not relieved with medicines or treatment.  · You have increased bruising or swelling.  · You have pain that is getting worse.  · Your symptoms have not improved after one week.  Get help right away if:  · You have a sudden, significant increase in pain.  · You have difficulty breathing.  · You have dizziness, weakness, or fainting.  · You have blood in your urine or stool.  · You cough up blood or you vomit blood.  Summary  · A chest contusion is a deep bruise to the chest that is usually caused by a hard hit, trauma, or direct force to your chest.  · Treatment for a chest contusion may include resting and applying ice to the injured area.  · Contact a health care provider if you have problems breathing or if your pain does not improve with treatment.  This information is not intended to replace advice given to you by your health care provider. Make sure you discuss any questions you have with your health care provider.  Document Released: 09/12/2002 Document Revised: 09/14/2017 Document Reviewed: 09/14/2017  [employer] Patient Education © 2017 [employer]

## 2022-02-04 ENCOUNTER — OCCUPATIONAL MEDICINE (OUTPATIENT)
Dept: URGENT CARE | Facility: CLINIC | Age: 32
End: 2022-02-04
Payer: COMMERCIAL

## 2022-02-04 VITALS
HEART RATE: 54 BPM | BODY MASS INDEX: 27.91 KG/M2 | OXYGEN SATURATION: 99 % | RESPIRATION RATE: 14 BRPM | TEMPERATURE: 98.2 F | SYSTOLIC BLOOD PRESSURE: 108 MMHG | DIASTOLIC BLOOD PRESSURE: 66 MMHG | WEIGHT: 167.5 LBS | HEIGHT: 65 IN

## 2022-02-04 DIAGNOSIS — S20.211D CONTUSION OF RIB ON RIGHT SIDE, SUBSEQUENT ENCOUNTER: ICD-10-CM

## 2022-02-04 PROCEDURE — 99213 OFFICE O/P EST LOW 20 MIN: CPT | Performed by: NURSE PRACTITIONER

## 2022-02-04 ASSESSMENT — ENCOUNTER SYMPTOMS
SHORTNESS OF BREATH: 0
COUGH: 0
HEMOPTYSIS: 0
ABDOMINAL PAIN: 0

## 2022-02-04 NOTE — LETTER
[facility]  [address] [place] [address]  Phone:  [phone] - Fax:  [phone]   [facility] Progress Report and Disability Certification  Date of Service: 2/4/2022   No Show:  No  Date / Time of Next Visit: 2/11/2022   Claim Information   Patient Name: Mark Johnston  Claim Number:     Employer:   [employer] Date of Injury: 1/21/2022     Insurer / TPA: Arlene Nazario  ID / SSN:     Occupation:   Diagnosis: The encounter diagnosis was Contusion of rib on right side, subsequent encounter.    Medical Information   Related to Industrial Injury? Yes    Subjective Complaints:  DOI: 1/21/2022. Reports improvement in right side pain starting Wednesday. Not experiencing the sharp pains as often with movement. Pain currently 4/10. Denies shortness of breath, hemoptysis, elevated heart rate, abdominal pain or swelling, neck or back pain, or hematuria. Taking OTC ibuprofen as needed.       Objective Findings: Physical Exam  Vitals reviewed.   Constitutional:       General: He is not in acute distress.  Pulmonary:      Effort: Pulmonary effort is normal. No accessory muscle usage, prolonged expiration, respiratory distress or retractions.      Breath sounds: Normal breath sounds. No stridor. No decreased breath sounds, wheezing, rhonchi or rales.   Chest:      Chest wall: Tenderness present. No deformity, swelling, crepitus or edema.      Comments: Lateral lower right rib tenderness to palpation.   Abdominal:      General: There is no distension.      Tenderness: There is no abdominal tenderness.   Musculoskeletal:         General: Tenderness present. No swelling or deformity. Normal range of motion.      Cervical back: No bony tenderness.      Thoracic back: No bony tenderness.      Lumbar back: No bony tenderness.   Skin:     General: Skin is warm and dry.      Findings: No bruising or erythema.   Neurological:       General: No focal deficit present.      Mental Status: He is oriented to person, place, and time.        Pre-Existing Condition(s): None known.   Assessment:   Condition Improved    Status: Additional Care Required  Permanent Disability:No    Plan: Medication    Diagnostics:      Comments:       Disability Information   Status: Released to Restricted Duty    From:  2/4/2022  Through: 2/11/2022 Restrictions are: Temporary   Physical Restrictions   Sitting:    Standing:    Stooping:    Bending:  < or = to 4 hrs/day   Squatting:    Walking:    Climbing:    Pushing:  < or = to 4 hrs/day   Pulling:  < or = to 4 hrs/day Other:    Reaching Above Shoulder (L):   Reaching Above Shoulder (R):       Reaching Below Shoulder (L):    Reaching Below Shoulder (R):      Not to exceed Weight Limits   Carrying(hrs):   Weight Limit(lb): < or = to 25 pounds Lifting(hrs):   Weight  Limit(lb): < or = to 25 pounds   Comments: -Rest and Ice   -Try not to avoid taking full respirations, breath normal. Splinting for pain.  -NSAIDs (Ibuprofen) for pain and inflammation.   -OTC lidocaine patches  -Can also take OTC Tylenol as directed for pain.   -Follow up in 1 week     Follow up emergently for difficulty breathing, severe uncontrolled pain, coughing up blood, elevated heart rate, abdominal pain or swelling, fever, weakness or dizziness.     Repetitive Actions   Hands: i.e. Fine Manipulations from Grasping:     Feet: i.e. Operating Foot Controls:     Driving / Operate Machinery:     Health Care Provider’s Original or Electronic Signature  ROSIE Parsons Health Care Provider’s Original or Electronic Signature    Zachariah Clancy MD         Clinic Name / Location: [facility] [address] [address] Clinic Phone Number: Dept: [phone]   Appointment Time: 5:00 Pm Visit Start Time: 5:02 PM   Check-In Time:  5:01 Pm Visit Discharge Time:  5:24PM   Original-Treating Physician or Chiropractor     Page 2-Insurer/TPA    Page 3-Employer    Page 4-Employee

## 2022-02-05 NOTE — PROGRESS NOTES
Subjective:     Mark Johnston is a 31 y.o. male who presents for Work-Related Injury (ribs doing better as of WED. )      DOI: 1/21/2022. Reports improvement in right side pain starting Wednesday. Not experiencing the sharp pains as often with movement. Pain currently 4/10. Denies shortness of breath, hemoptysis, elevated heart rate, abdominal pain or swelling, neck or back pain, or hematuria. Taking OTC ibuprofen as needed.        Past Medical History:   Diagnosis Date   • ADHD    • Hernia, inguinal, left        Past Surgical History:   Procedure Laterality Date   • DENTAL EXTRACTION(S)         Social History     Socioeconomic History   • Marital status: Single     Spouse name: Not on file   • Number of children: Not on file   • Years of education: Not on file   • Highest education level: Not on file   Occupational History     Comment:    Tobacco Use   • Smoking status: Light Tobacco Smoker     Packs/day: 1.00     Years: 10.00     Pack years: 10.00     Types: Cigarettes   • Smokeless tobacco: Never Used   Vaping Use   • Vaping Use: Never used   Substance and Sexual Activity   • Alcohol use: No   • Drug use: Yes     Types: Marijuana   • Sexual activity: Yes     Partners: Female     Birth control/protection: Condom   Other Topics Concern   • Not on file   Social History Narrative   • Not on file     Social Determinants of Health     Financial Resource Strain:    • Difficulty of Paying Living Expenses: Not on file   Food Insecurity:    • Worried About Running Out of Food in the Last Year: Not on file   • Ran Out of Food in the Last Year: Not on file   Transportation Needs:    • Lack of Transportation (Medical): Not on file   • Lack of Transportation (Non-Medical): Not on file   Physical Activity:    • Days of Exercise per Week: Not on file   • Minutes of Exercise per Session: Not on file   Stress:    • Feeling of Stress : Not on file   Social Connections:    • Frequency of Communication with Friends and  "Family: Not on file   • Frequency of Social Gatherings with Friends and Family: Not on file   • Attends Nondenominational Services: Not on file   • Active Member of Clubs or Organizations: Not on file   • Attends Club or Organization Meetings: Not on file   • Marital Status: Not on file   Intimate Partner Violence:    • Fear of Current or Ex-Partner: Not on file   • Emotionally Abused: Not on file   • Physically Abused: Not on file   • Sexually Abused: Not on file   Housing Stability:    • Unable to Pay for Housing in the Last Year: Not on file   • Number of Places Lived in the Last Year: Not on file   • Unstable Housing in the Last Year: Not on file        Family History   Problem Relation Age of Onset   • Cancer Mother         Lymphoma   • Lung Disease Mother    • No Known Problems Father    • No Known Problems Sister    • Breast Cancer Maternal Grandmother    • Heart Attack Paternal Grandmother    • Stroke Paternal Grandfather    • Seizures Paternal Grandfather         No Known Allergies    Review of Systems   Respiratory: Negative for cough, hemoptysis and shortness of breath.    Gastrointestinal: Negative for abdominal pain.   All other systems reviewed and are negative.       Objective:   /66 (BP Location: Right arm, Patient Position: Sitting)   Pulse (!) 54   Temp 36.8 °C (98.2 °F) (Temporal)   Resp 14   Ht 1.651 m (5' 5\")   Wt 76 kg (167 lb 8 oz)   SpO2 99%   BMI 27.87 kg/m²     Physical Exam  Vitals reviewed.   Constitutional:       General: He is not in acute distress.  Pulmonary:      Effort: Pulmonary effort is normal. No accessory muscle usage, prolonged expiration, respiratory distress or retractions.      Breath sounds: Normal breath sounds. No stridor. No decreased breath sounds, wheezing, rhonchi or rales.   Chest:      Chest wall: Tenderness present. No deformity, swelling, crepitus or edema.      Comments: Lateral lower right rib tenderness to palpation.   Abdominal:      General: There is no " distension.      Tenderness: There is no abdominal tenderness.   Musculoskeletal:         General: Tenderness present. No swelling or deformity. Normal range of motion.      Cervical back: No bony tenderness.      Thoracic back: No bony tenderness.      Lumbar back: No bony tenderness.   Skin:     General: Skin is warm and dry.      Findings: No bruising or erythema.   Neurological:      General: No focal deficit present.      Mental Status: He is alert and oriented to person, place, and time.         Assessment/Plan:   1. Contusion of rib on right side, subsequent encounter  -Rest and Ice   -Try not to avoid taking full respirations, breath normal. Splinting for pain.  -NSAIDs (Ibuprofen) for pain and inflammation.   -OTC lidocaine patches  -Can also take OTC Tylenol as directed for pain.   -Follow up in 1 week     Follow up emergently for difficulty breathing, severe uncontrolled pain, coughing up blood, elevated heart rate, abdominal pain or swelling, fever, weakness or dizziness.     Differential diagnosis, natural history, supportive care, and indications for immediate follow-up discussed.